# Patient Record
Sex: FEMALE | URBAN - METROPOLITAN AREA
[De-identification: names, ages, dates, MRNs, and addresses within clinical notes are randomized per-mention and may not be internally consistent; named-entity substitution may affect disease eponyms.]

---

## 2021-07-29 ENCOUNTER — APPOINTMENT (RX ONLY)
Dept: URBAN - METROPOLITAN AREA CLINIC 11 | Facility: CLINIC | Age: 51
Setting detail: DERMATOLOGY
End: 2021-07-29

## 2021-07-29 DIAGNOSIS — Z41.1 ENCOUNTER FOR COSMETIC SURGERY: ICD-10-CM

## 2021-07-29 PROCEDURE — 99003: CPT

## 2021-07-29 PROCEDURE — ? CONSULTATION - AESTHETIC FACIAL DEFORMITY

## 2021-07-29 NOTE — PROCEDURE: CONSULTATION - AESTHETIC FACIAL DEFORMITY
Other Plan: buccal fat pad removal, lower lid TCA peel
Detail Level: Simple
Send Procedure Quote As Charge: No

## 2021-08-11 ENCOUNTER — APPOINTMENT (RX ONLY)
Dept: URBAN - METROPOLITAN AREA CLINIC 11 | Facility: CLINIC | Age: 51
Setting detail: DERMATOLOGY
End: 2021-08-11

## 2021-08-11 DIAGNOSIS — Z41.9 ENCOUNTER FOR PROCEDURE FOR PURPOSES OTHER THAN REMEDYING HEALTH STATE, UNSPECIFIED: ICD-10-CM

## 2021-08-11 PROCEDURE — ? PRE-OP WORKLIST

## 2021-08-11 PROCEDURE — Z1101: HCPCS

## 2021-08-11 PROCEDURE — 99004: CPT

## 2021-08-11 PROCEDURE — ? PRESCRIPTION

## 2021-08-11 RX ORDER — DOXYCYCLINE HYCLATE 100 MG/1
TABLET, COATED ORAL
Qty: 14 | Refills: 0 | Status: ERX | COMMUNITY
Start: 2021-08-11

## 2021-08-11 RX ORDER — GABAPENTIN 300 MG/1
CAPSULE ORAL
Qty: 2 | Refills: 0 | Status: ERX | COMMUNITY
Start: 2021-08-11

## 2021-08-11 RX ORDER — MELOXICAM 7.5 MG/1
TABLET ORAL
Qty: 4 | Refills: 0 | Status: ERX | COMMUNITY
Start: 2021-08-11

## 2021-08-11 RX ORDER — ONDANSETRON HYDROCHLORIDE 4 MG/1
TABLET, FILM COATED ORAL PRN
Qty: 15 | Refills: 0 | Status: ERX | COMMUNITY
Start: 2021-08-11

## 2021-08-11 RX ORDER — OXYCODONE HYDROCHLORIDE 5 MG/1
TABLET ORAL
Qty: 20 | Refills: 0 | Status: ERX | COMMUNITY
Start: 2021-08-11

## 2021-08-11 RX ADMIN — DOXYCYCLINE HYCLATE: 100 TABLET, COATED ORAL at 00:00

## 2021-08-11 RX ADMIN — OXYCODONE HYDROCHLORIDE: 5 TABLET ORAL at 00:00

## 2021-08-11 RX ADMIN — MELOXICAM: 7.5 TABLET ORAL at 00:00

## 2021-08-11 RX ADMIN — ONDANSETRON HYDROCHLORIDE: 4 TABLET, FILM COATED ORAL at 00:00

## 2021-08-11 RX ADMIN — GABAPENTIN: 300 CAPSULE ORAL at 00:00

## 2021-08-11 ASSESSMENT — LOCATION SIMPLE DESCRIPTION DERM
LOCATION SIMPLE: RIGHT SUPERIOR EYELID
LOCATION SIMPLE: LEFT INFERIOR EYELID
LOCATION SIMPLE: RIGHT CHEEK
LOCATION SIMPLE: LEFT CHEEK
LOCATION SIMPLE: RIGHT INFERIOR EYELID
LOCATION SIMPLE: LEFT SUPERIOR EYELID

## 2021-08-11 ASSESSMENT — LOCATION DETAILED DESCRIPTION DERM
LOCATION DETAILED: LEFT LATERAL INFERIOR EYELID
LOCATION DETAILED: LEFT MID PREAURICULAR CHEEK
LOCATION DETAILED: RIGHT INFERIOR MEDIAL MALAR CHEEK
LOCATION DETAILED: RIGHT LATERAL SUPERIOR EYELID
LOCATION DETAILED: LEFT INFERIOR MEDIAL MALAR CHEEK
LOCATION DETAILED: LEFT LATERAL SUPERIOR EYELID
LOCATION DETAILED: RIGHT LATERAL INFERIOR EYELID
LOCATION DETAILED: RIGHT MID PREAURICULAR CHEEK

## 2021-08-11 ASSESSMENT — LOCATION ZONE DERM
LOCATION ZONE: EYELID
LOCATION ZONE: FACE

## 2021-08-11 NOTE — HPI: PREOPERATIVE APPOINTMENT (BRIEF)
Has The Patient Completed Informed Consent?: is scheduled to complete informed consent documentation during this visit
Has The Patient Fulfilled Financial Responsibilities For Surgical Scheduling?: is scheduled to complete payment to fulfill financial responsibilities during this visit
Have Arrangements And Instructions Been Made For Patient Transportation?: Transportation arrangements have been made
Date Of Procedure?: 08/18/2021
Facility: [facility]
Surgeon: Dr. Caro

## 2021-08-11 NOTE — PROCEDURE: PRE-OP WORKLIST
Detail Level: Simple
Admission Status: outpatient
Surgeon: Dr. Caro
Surgery Scheduled: facelift, fat grafting, upper blepharoplasty, lower lid TCA peel, buccal fat pad removal
Photos Taken?: yes
Date Of Surgery: 08/18/2021

## 2021-08-18 ENCOUNTER — APPOINTMENT (RX ONLY)
Dept: URBAN - METROPOLITAN AREA SURGERY CENTER 4 | Facility: SURGERY CENTER | Age: 51
Setting detail: DERMATOLOGY
End: 2021-08-18

## 2021-08-18 DIAGNOSIS — Z41.1 ENCOUNTER FOR COSMETIC SURGERY: ICD-10-CM

## 2021-08-18 PROCEDURE — 15823 BLEPHARP UPR EYELID XCSV SKN: CPT | Mod: 50

## 2021-08-18 PROCEDURE — 15829 RHYTIDECTOMY SMAS FLAP: CPT

## 2021-08-18 PROCEDURE — Z1040: HCPCS

## 2021-08-18 PROCEDURE — ? SET GLOBAL PERIOD

## 2021-08-18 NOTE — PROCEDURE: SET GLOBAL PERIOD
Other Surgery Performed: facelift upper lid blepharoplasty fat grafting lower lid TCA and buccal fat pad removal
Detail Level: Detailed
Surgery Global Period: 0
Date Of Surgery (Mm/Dd/Yyyy): 08/18/2021

## 2021-08-19 ENCOUNTER — APPOINTMENT (RX ONLY)
Dept: URBAN - METROPOLITAN AREA CLINIC 11 | Facility: CLINIC | Age: 51
Setting detail: DERMATOLOGY
End: 2021-08-19

## 2021-08-19 DIAGNOSIS — Z48.89 ENCOUNTER FOR OTHER SPECIFIED SURGICAL AFTERCARE: ICD-10-CM

## 2021-08-19 PROCEDURE — ? COUNSELING - POST-OP CHECK, FACELIFT

## 2021-08-19 PROCEDURE — 99024 POSTOP FOLLOW-UP VISIT: CPT

## 2021-08-19 PROCEDURE — ? DRAIN REMOVAL

## 2021-08-19 PROCEDURE — ? COUNSELING - POST-OP CHECK, FAT TRANSFER

## 2021-08-19 ASSESSMENT — LOCATION DETAILED DESCRIPTION DERM
LOCATION DETAILED: RIGHT CENTRAL MALAR CHEEK
LOCATION DETAILED: LEFT SUPERIOR PREAURICULAR CHEEK
LOCATION DETAILED: LEFT CENTRAL POSTAURICULAR SKIN
LOCATION DETAILED: RIGHT SUPERIOR PREAURICULAR CHEEK
LOCATION DETAILED: LEFT CENTRAL MALAR CHEEK
LOCATION DETAILED: RIGHT CENTRAL POSTAURICULAR SKIN
LOCATION DETAILED: SUBMENTAL CHIN

## 2021-08-19 ASSESSMENT — LOCATION ZONE DERM
LOCATION ZONE: FACE
LOCATION ZONE: SCALP

## 2021-08-19 ASSESSMENT — LOCATION SIMPLE DESCRIPTION DERM
LOCATION SIMPLE: SCALP
LOCATION SIMPLE: LEFT CHEEK
LOCATION SIMPLE: RIGHT CHEEK
LOCATION SIMPLE: SUBMENTAL CHIN

## 2021-08-19 NOTE — PROCEDURE: COUNSELING - POST-OP CHECK, FAT TRANSFER
Count Minor/Major Decisions Toward Mdm (Not Cosmetic)?: No
Add Postop Global No-Charge Code (97390)?: yes
Detail Level: Simple

## 2021-08-30 ENCOUNTER — APPOINTMENT (RX ONLY)
Dept: URBAN - METROPOLITAN AREA CLINIC 11 | Facility: CLINIC | Age: 51
Setting detail: DERMATOLOGY
End: 2021-08-30

## 2021-08-30 DIAGNOSIS — Z48.89 ENCOUNTER FOR OTHER SPECIFIED SURGICAL AFTERCARE: ICD-10-CM

## 2021-08-30 DIAGNOSIS — Z42.8 ENCOUNTER FOR OTHER PLASTIC AND RECONSTRUCTIVE SURGERY FOLLOWING MEDICAL PROCEDURE OR HEALED INJURY: ICD-10-CM

## 2021-08-30 PROCEDURE — ? PATIENT SPECIFIC COUNSELING

## 2021-08-30 PROCEDURE — ? COUNSELING - POST-OP CHECK, FAT TRANSFER

## 2021-08-30 PROCEDURE — 99024 POSTOP FOLLOW-UP VISIT: CPT

## 2021-08-30 PROCEDURE — ? PRESCRIPTION

## 2021-08-30 PROCEDURE — ? COUNSELING - POST-OP CHECK, FACELIFT

## 2021-08-30 PROCEDURE — ? COUNSELING - POST-OP CHECK, BLEPHAROPLASTY

## 2021-08-30 PROCEDURE — ? SUTURE REMOVAL

## 2021-08-30 RX ORDER — MUPIROCIN 20 MG/G
OINTMENT TOPICAL BID
Qty: 15 | Refills: 1 | Status: ERX | COMMUNITY
Start: 2021-08-30

## 2021-08-30 RX ADMIN — MUPIROCIN: 20 OINTMENT TOPICAL at 00:00

## 2021-08-30 ASSESSMENT — LOCATION SIMPLE DESCRIPTION DERM
LOCATION SIMPLE: RIGHT SUPERIOR EYELID
LOCATION SIMPLE: SUBMENTAL CHIN
LOCATION SIMPLE: LEFT SUPERIOR EYELID
LOCATION SIMPLE: SCALP
LOCATION SIMPLE: LEFT CHEEK
LOCATION SIMPLE: GROIN
LOCATION SIMPLE: RIGHT CHEEK

## 2021-08-30 ASSESSMENT — LOCATION DETAILED DESCRIPTION DERM
LOCATION DETAILED: LEFT SUPERIOR CENTRAL MALAR CHEEK
LOCATION DETAILED: SUBMENTAL CHIN
LOCATION DETAILED: LEFT CENTRAL POSTAURICULAR SKIN
LOCATION DETAILED: RIGHT CENTRAL MALAR CHEEK
LOCATION DETAILED: RIGHT SUPERIOR PREAURICULAR CHEEK
LOCATION DETAILED: LEFT SUPERIOR PREAURICULAR CHEEK
LOCATION DETAILED: RIGHT LATERAL SUPERIOR EYELID
LOCATION DETAILED: LEFT CENTRAL MALAR CHEEK
LOCATION DETAILED: LEFT LATERAL SUPERIOR EYELID
LOCATION DETAILED: RIGHT CENTRAL POSTAURICULAR SKIN
LOCATION DETAILED: RIGHT SUPRAPUBIC SKIN
LOCATION DETAILED: RIGHT SUPERIOR CENTRAL MALAR CHEEK

## 2021-08-30 ASSESSMENT — LOCATION ZONE DERM
LOCATION ZONE: FACE
LOCATION ZONE: SCALP
LOCATION ZONE: TRUNK
LOCATION ZONE: EYELID

## 2021-08-30 NOTE — PROCEDURE: COUNSELING - POST-OP CHECK, FAT TRANSFER
Count Minor/Major Decisions Toward Mdm (Not Cosmetic)?: No
Add Postop Global No-Charge Code (34120)?: yes
Detail Level: Simple

## 2021-09-20 ENCOUNTER — APPOINTMENT (RX ONLY)
Dept: URBAN - METROPOLITAN AREA CLINIC 11 | Facility: CLINIC | Age: 51
Setting detail: DERMATOLOGY
End: 2021-09-20

## 2021-09-20 DIAGNOSIS — Z48.89 ENCOUNTER FOR OTHER SPECIFIED SURGICAL AFTERCARE: ICD-10-CM

## 2021-09-20 DIAGNOSIS — Z42.8 ENCOUNTER FOR OTHER PLASTIC AND RECONSTRUCTIVE SURGERY FOLLOWING MEDICAL PROCEDURE OR HEALED INJURY: ICD-10-CM

## 2021-09-20 PROCEDURE — ? COUNSELING - POST-OP CHECK, FACELIFT

## 2021-09-20 PROCEDURE — 99024 POSTOP FOLLOW-UP VISIT: CPT

## 2021-09-20 PROCEDURE — ? PATIENT SPECIFIC COUNSELING

## 2021-09-20 PROCEDURE — ? COUNSELING - POST-OP CHECK, FAT TRANSFER

## 2021-09-20 PROCEDURE — ? COUNSELING - POST-OP CHECK, BLEPHAROPLASTY

## 2021-09-20 ASSESSMENT — LOCATION DETAILED DESCRIPTION DERM
LOCATION DETAILED: SUBMENTAL CHIN
LOCATION DETAILED: RIGHT SUPERIOR CENTRAL MALAR CHEEK
LOCATION DETAILED: RIGHT CENTRAL MALAR CHEEK
LOCATION DETAILED: LEFT CENTRAL POSTAURICULAR SKIN
LOCATION DETAILED: RIGHT LATERAL SUPERIOR EYELID
LOCATION DETAILED: LEFT LATERAL SUPERIOR EYELID
LOCATION DETAILED: RIGHT CENTRAL POSTAURICULAR SKIN
LOCATION DETAILED: RIGHT SUPERIOR PREAURICULAR CHEEK
LOCATION DETAILED: LEFT CENTRAL MALAR CHEEK
LOCATION DETAILED: LEFT SUPERIOR CENTRAL MALAR CHEEK
LOCATION DETAILED: LEFT SUPERIOR PREAURICULAR CHEEK

## 2021-09-20 ASSESSMENT — LOCATION SIMPLE DESCRIPTION DERM
LOCATION SIMPLE: LEFT SUPERIOR EYELID
LOCATION SIMPLE: SUBMENTAL CHIN
LOCATION SIMPLE: LEFT CHEEK
LOCATION SIMPLE: SCALP
LOCATION SIMPLE: RIGHT CHEEK
LOCATION SIMPLE: RIGHT SUPERIOR EYELID

## 2021-09-20 ASSESSMENT — LOCATION ZONE DERM
LOCATION ZONE: EYELID
LOCATION ZONE: SCALP
LOCATION ZONE: FACE

## 2021-09-20 NOTE — PROCEDURE: COUNSELING - POST-OP CHECK, FAT TRANSFER
Count Minor/Major Decisions Toward Mdm (Not Cosmetic)?: No
Add Postop Global No-Charge Code (25305)?: yes
Detail Level: Simple

## 2021-09-20 NOTE — PROCEDURE: REASSURANCE
Follow-Up Interval: months
Information Provided: The examination is within normal limits today.
Follow-Up Increment: 3
Detail Level: Simple
Follow-Up: yes

## 2021-12-15 NOTE — HPI: POST-OP CHECK, COSMETIC (EXTENDED)
CALL 911 IF HAVING ANY INCREASED SHORTNESS OF BREATH, CHEST PAINS, LIGHTHEADEDNESS OR WORSENED GENERALIZED WEAKNESS.  
What Best Describes The Level Of Symmetry? (Check All That Apply): good
How Severe Is Your Pain?: mild
How Is Your Wound Healing?: healing well
Compared To The Last Evaluation, How Have The Findings Changed?: improved
What Is Your Overall Satisfaction Level With The Current Outcome?: satisfied
Date Of Procedure: 8/18/2021
No

## 2021-12-20 ENCOUNTER — APPOINTMENT (RX ONLY)
Dept: URBAN - METROPOLITAN AREA CLINIC 11 | Facility: CLINIC | Age: 51
Setting detail: DERMATOLOGY
End: 2021-12-20

## 2021-12-20 DIAGNOSIS — Z42.8 ENCOUNTER FOR OTHER PLASTIC AND RECONSTRUCTIVE SURGERY FOLLOWING MEDICAL PROCEDURE OR HEALED INJURY: ICD-10-CM

## 2021-12-20 DIAGNOSIS — Z48.89 ENCOUNTER FOR OTHER SPECIFIED SURGICAL AFTERCARE: ICD-10-CM

## 2021-12-20 PROCEDURE — ? COUNSELING - POST-OP CHECK, FACELIFT

## 2021-12-20 PROCEDURE — ? COUNSELING - POST-OP CHECK, FAT TRANSFER

## 2021-12-20 PROCEDURE — 99024 POSTOP FOLLOW-UP VISIT: CPT

## 2021-12-20 PROCEDURE — ? COUNSELING - POST-OP CHECK

## 2021-12-20 PROCEDURE — ? COUNSELING - POST-OP CHECK, BLEPHAROPLASTY

## 2021-12-20 PROCEDURE — ? SUTURE REMOVAL

## 2021-12-20 PROCEDURE — ? PATIENT SPECIFIC COUNSELING

## 2021-12-20 ASSESSMENT — PAIN INTENSITY VAS: HOW INTENSE IS YOUR PAIN 0 BEING NO PAIN, 10 BEING THE MOST SEVERE PAIN POSSIBLE?: NO PAIN

## 2021-12-20 ASSESSMENT — LOCATION DETAILED DESCRIPTION DERM
LOCATION DETAILED: LEFT CENTRAL MALAR CHEEK
LOCATION DETAILED: RIGHT CENTRAL POSTAURICULAR SKIN
LOCATION DETAILED: LEFT SUPERIOR CENTRAL MALAR CHEEK
LOCATION DETAILED: RIGHT INFERIOR CENTRAL MALAR CHEEK
LOCATION DETAILED: SUBMENTAL CHIN
LOCATION DETAILED: LEFT LATERAL SUPERIOR EYELID
LOCATION DETAILED: LEFT CENTRAL POSTAURICULAR SKIN
LOCATION DETAILED: LEFT SUPERIOR PREAURICULAR CHEEK
LOCATION DETAILED: LEFT INFERIOR CENTRAL MALAR CHEEK
LOCATION DETAILED: RIGHT SUPERIOR CENTRAL MALAR CHEEK
LOCATION DETAILED: RIGHT LATERAL SUPERIOR EYELID
LOCATION DETAILED: RIGHT SUPERIOR PREAURICULAR CHEEK
LOCATION DETAILED: RIGHT CENTRAL MALAR CHEEK

## 2021-12-20 ASSESSMENT — LOCATION SIMPLE DESCRIPTION DERM
LOCATION SIMPLE: SUBMENTAL CHIN
LOCATION SIMPLE: SCALP
LOCATION SIMPLE: LEFT SUPERIOR EYELID
LOCATION SIMPLE: RIGHT SUPERIOR EYELID
LOCATION SIMPLE: RIGHT CHEEK
LOCATION SIMPLE: LEFT CHEEK

## 2021-12-20 ASSESSMENT — LOCATION ZONE DERM
LOCATION ZONE: EYELID
LOCATION ZONE: FACE
LOCATION ZONE: SCALP

## 2021-12-20 NOTE — HPI: POST-OP CHECK, COSMETIC (EXTENDED)
What Best Describes The Level Of Symmetry? (Check All That Apply): good
How Is Your Wound Healing?: healing well
Compared To The Last Evaluation, How Have The Findings Changed?: improved
What Is Your Overall Satisfaction Level With The Current Outcome?: satisfied
Date Of Procedure: 8/18/2021
Additional History: Patient has a spitting suture at the right post-auricular incision

## 2021-12-20 NOTE — PROCEDURE: REASSURANCE
Follow-Up Interval: months
Information Provided: The examination is within normal limits today.
Follow-Up Increment: 3
Detail Level: Simple
Follow-Up: no

## 2022-08-18 ENCOUNTER — APPOINTMENT (RX ONLY)
Dept: URBAN - METROPOLITAN AREA CLINIC 11 | Facility: CLINIC | Age: 52
Setting detail: DERMATOLOGY
End: 2022-08-18

## 2022-08-18 DIAGNOSIS — Z48.89 ENCOUNTER FOR OTHER SPECIFIED SURGICAL AFTERCARE: ICD-10-CM

## 2022-08-18 DIAGNOSIS — Z42.8 ENCOUNTER FOR OTHER PLASTIC AND RECONSTRUCTIVE SURGERY FOLLOWING MEDICAL PROCEDURE OR HEALED INJURY: ICD-10-CM

## 2022-08-18 PROCEDURE — 99024 POSTOP FOLLOW-UP VISIT: CPT

## 2022-08-18 PROCEDURE — ? COUNSELING - POST-OP CHECK, FAT TRANSFER

## 2022-08-18 PROCEDURE — ? COUNSELING - POST-OP CHECK

## 2022-08-18 PROCEDURE — ? COUNSELING - POST-OP CHECK, BLEPHAROPLASTY

## 2022-08-18 PROCEDURE — ? PHOTOS OBTAINED

## 2022-08-18 PROCEDURE — ? PATIENT SPECIFIC COUNSELING

## 2022-08-18 PROCEDURE — ? COUNSELING - POST-OP CHECK, FACELIFT

## 2022-08-18 ASSESSMENT — LOCATION DETAILED DESCRIPTION DERM
LOCATION DETAILED: LEFT CENTRAL POSTAURICULAR SKIN
LOCATION DETAILED: RIGHT SUPERIOR PREAURICULAR CHEEK
LOCATION DETAILED: RIGHT CENTRAL POSTAURICULAR SKIN
LOCATION DETAILED: LEFT CENTRAL MALAR CHEEK
LOCATION DETAILED: SUBMENTAL CHIN
LOCATION DETAILED: RIGHT SUPERIOR CENTRAL MALAR CHEEK
LOCATION DETAILED: LEFT LATERAL SUPERIOR EYELID
LOCATION DETAILED: LEFT SUPERIOR PREAURICULAR CHEEK
LOCATION DETAILED: LEFT SUPERIOR CENTRAL MALAR CHEEK
LOCATION DETAILED: RIGHT CENTRAL MALAR CHEEK
LOCATION DETAILED: RIGHT INFERIOR CENTRAL MALAR CHEEK
LOCATION DETAILED: LEFT INFERIOR CENTRAL MALAR CHEEK
LOCATION DETAILED: RIGHT LATERAL SUPERIOR EYELID

## 2022-08-18 ASSESSMENT — LOCATION SIMPLE DESCRIPTION DERM
LOCATION SIMPLE: LEFT CHEEK
LOCATION SIMPLE: SUBMENTAL CHIN
LOCATION SIMPLE: RIGHT SUPERIOR EYELID
LOCATION SIMPLE: LEFT SUPERIOR EYELID
LOCATION SIMPLE: RIGHT CHEEK
LOCATION SIMPLE: SCALP

## 2022-08-18 ASSESSMENT — LOCATION ZONE DERM
LOCATION ZONE: FACE
LOCATION ZONE: SCALP
LOCATION ZONE: EYELID

## 2022-08-18 NOTE — PROCEDURE: PHOTOS OBTAINED
Follow-Up Increment: 1
Photographed Locations: Photos were obtained. \\n
Follow-Up Interval: year
Follow-Up For Additional Photos?: no
Detail Level: Simple

## 2022-08-18 NOTE — HPI: POST-OP CHECK, COSMETIC (EXTENDED)
How Severe Is Your Pain?: 0 out of 10
How Is Your Wound Healing?: healed
Compared To The Last Evaluation, How Have The Findings Changed?: stable
Date Of Procedure: 08/18/2021

## 2022-08-18 NOTE — PROCEDURE: COUNSELING - POST-OP CHECK, FAT TRANSFER
Count Minor/Major Decisions Toward Mdm (Not Cosmetic)?: No
Add Postop Global No-Charge Code (88108)?: yes
Detail Level: Simple